# Patient Record
Sex: FEMALE | Race: WHITE | NOT HISPANIC OR LATINO | Employment: OTHER | URBAN - NONMETROPOLITAN AREA
[De-identification: names, ages, dates, MRNs, and addresses within clinical notes are randomized per-mention and may not be internally consistent; named-entity substitution may affect disease eponyms.]

---

## 2021-03-23 ENCOUNTER — APPOINTMENT (OUTPATIENT)
Dept: GENERAL RADIOLOGY | Facility: HOSPITAL | Age: 66
End: 2021-03-23

## 2021-03-23 ENCOUNTER — APPOINTMENT (OUTPATIENT)
Dept: CT IMAGING | Facility: HOSPITAL | Age: 66
End: 2021-03-23

## 2021-03-23 ENCOUNTER — HOSPITAL ENCOUNTER (EMERGENCY)
Facility: HOSPITAL | Age: 66
Discharge: HOME | End: 2021-03-23
Attending: FAMILY MEDICINE | Admitting: FAMILY MEDICINE

## 2021-03-23 VITALS
WEIGHT: 155 LBS | TEMPERATURE: 98.7 F | SYSTOLIC BLOOD PRESSURE: 140 MMHG | DIASTOLIC BLOOD PRESSURE: 88 MMHG | RESPIRATION RATE: 18 BRPM | HEART RATE: 67 BPM | BODY MASS INDEX: 23.49 KG/M2 | HEIGHT: 68 IN | OXYGEN SATURATION: 100 %

## 2021-03-23 DIAGNOSIS — S39.011A STRAIN OF ABDOMINAL WALL, INITIAL ENCOUNTER: Primary | ICD-10-CM

## 2021-03-23 LAB
ALBUMIN SERPL-MCNC: 4.08 G/DL (ref 3.5–5.2)
ALBUMIN/GLOB SERPL: 1.7 G/DL
ALP SERPL-CCNC: 86 U/L (ref 39–117)
ALT SERPL W P-5'-P-CCNC: 44 U/L (ref 1–33)
ANION GAP SERPL CALCULATED.3IONS-SCNC: 8.2 MMOL/L (ref 5–15)
AST SERPL-CCNC: 26 U/L (ref 1–32)
BASOPHILS # BLD AUTO: 0.03 10*3/MM3 (ref 0–0.2)
BASOPHILS NFR BLD AUTO: 0.8 % (ref 0–1.5)
BILIRUB SERPL-MCNC: 0.5 MG/DL (ref 0–1.2)
BILIRUB UR QL STRIP: NEGATIVE
BUN SERPL-MCNC: 13 MG/DL (ref 8–23)
BUN/CREAT SERPL: 17.6 (ref 7–25)
CALCIUM SPEC-SCNC: 9.1 MG/DL (ref 8.6–10.5)
CHLORIDE SERPL-SCNC: 108 MMOL/L (ref 98–107)
CLARITY UR: CLEAR
CO2 SERPL-SCNC: 23.8 MMOL/L (ref 22–29)
COLOR UR: YELLOW
CREAT SERPL-MCNC: 0.74 MG/DL (ref 0.57–1)
D-LACTATE SERPL-SCNC: 1.5 MMOL/L (ref 0.5–2)
DEPRECATED RDW RBC AUTO: 43.8 FL (ref 37–54)
EOSINOPHIL # BLD AUTO: 0.09 10*3/MM3 (ref 0–0.4)
EOSINOPHIL NFR BLD AUTO: 2.3 % (ref 0.3–6.2)
ERYTHROCYTE [DISTWIDTH] IN BLOOD BY AUTOMATED COUNT: 13.2 % (ref 12.3–15.4)
GFR SERPL CREATININE-BSD FRML MDRD: 79 ML/MIN/1.73
GLOBULIN UR ELPH-MCNC: 2.4 GM/DL
GLUCOSE SERPL-MCNC: 185 MG/DL (ref 65–99)
GLUCOSE UR STRIP-MCNC: ABNORMAL MG/DL
HCT VFR BLD AUTO: 38.6 % (ref 34–46.6)
HGB BLD-MCNC: 11.9 G/DL (ref 12–15.9)
HGB UR QL STRIP.AUTO: NEGATIVE
IMM GRANULOCYTES # BLD AUTO: 0.01 10*3/MM3 (ref 0–0.05)
IMM GRANULOCYTES NFR BLD AUTO: 0.3 % (ref 0–0.5)
KETONES UR QL STRIP: NEGATIVE
LEUKOCYTE ESTERASE UR QL STRIP.AUTO: NEGATIVE
LYMPHOCYTES # BLD AUTO: 1.21 10*3/MM3 (ref 0.7–3.1)
LYMPHOCYTES NFR BLD AUTO: 31 % (ref 19.6–45.3)
MCH RBC QN AUTO: 27.9 PG (ref 26.6–33)
MCHC RBC AUTO-ENTMCNC: 30.8 G/DL (ref 31.5–35.7)
MCV RBC AUTO: 90.4 FL (ref 79–97)
MONOCYTES # BLD AUTO: 0.34 10*3/MM3 (ref 0.1–0.9)
MONOCYTES NFR BLD AUTO: 8.7 % (ref 5–12)
NEUTROPHILS NFR BLD AUTO: 2.22 10*3/MM3 (ref 1.7–7)
NEUTROPHILS NFR BLD AUTO: 56.9 % (ref 42.7–76)
NITRITE UR QL STRIP: NEGATIVE
NRBC BLD AUTO-RTO: 0 /100 WBC (ref 0–0.2)
PH UR STRIP.AUTO: 5.5 [PH] (ref 5–8)
PLATELET # BLD AUTO: 173 10*3/MM3 (ref 140–450)
PMV BLD AUTO: 11.2 FL (ref 6–12)
POTASSIUM SERPL-SCNC: 4.2 MMOL/L (ref 3.5–5.2)
PROT SERPL-MCNC: 6.5 G/DL (ref 6–8.5)
PROT UR QL STRIP: NEGATIVE
RBC # BLD AUTO: 4.27 10*6/MM3 (ref 3.77–5.28)
SODIUM SERPL-SCNC: 140 MMOL/L (ref 136–145)
SP GR UR STRIP: 1.01 (ref 1–1.03)
TROPONIN T SERPL-MCNC: <0.01 NG/ML (ref 0–0.03)
UROBILINOGEN UR QL STRIP: ABNORMAL
WBC # BLD AUTO: 3.9 10*3/MM3 (ref 3.4–10.8)

## 2021-03-23 PROCEDURE — 96375 TX/PRO/DX INJ NEW DRUG ADDON: CPT

## 2021-03-23 PROCEDURE — 25010000002 ONDANSETRON PER 1 MG: Performed by: PHYSICIAN ASSISTANT

## 2021-03-23 PROCEDURE — 93010 ELECTROCARDIOGRAM REPORT: CPT | Performed by: INTERNAL MEDICINE

## 2021-03-23 PROCEDURE — 80053 COMPREHEN METABOLIC PANEL: CPT | Performed by: PHYSICIAN ASSISTANT

## 2021-03-23 PROCEDURE — 74176 CT ABD & PELVIS W/O CONTRAST: CPT

## 2021-03-23 PROCEDURE — 99283 EMERGENCY DEPT VISIT LOW MDM: CPT

## 2021-03-23 PROCEDURE — 25010000002 MORPHINE PER 10 MG: Performed by: FAMILY MEDICINE

## 2021-03-23 PROCEDURE — 81003 URINALYSIS AUTO W/O SCOPE: CPT | Performed by: PHYSICIAN ASSISTANT

## 2021-03-23 PROCEDURE — 84484 ASSAY OF TROPONIN QUANT: CPT | Performed by: PHYSICIAN ASSISTANT

## 2021-03-23 PROCEDURE — 25010000002 DEXAMETHASONE PER 1 MG: Performed by: PHYSICIAN ASSISTANT

## 2021-03-23 PROCEDURE — 85025 COMPLETE CBC W/AUTO DIFF WBC: CPT | Performed by: PHYSICIAN ASSISTANT

## 2021-03-23 PROCEDURE — 96374 THER/PROPH/DIAG INJ IV PUSH: CPT

## 2021-03-23 PROCEDURE — 71101 X-RAY EXAM UNILAT RIBS/CHEST: CPT | Performed by: RADIOLOGY

## 2021-03-23 PROCEDURE — 93005 ELECTROCARDIOGRAM TRACING: CPT | Performed by: PHYSICIAN ASSISTANT

## 2021-03-23 PROCEDURE — 71101 X-RAY EXAM UNILAT RIBS/CHEST: CPT

## 2021-03-23 PROCEDURE — 96376 TX/PRO/DX INJ SAME DRUG ADON: CPT

## 2021-03-23 PROCEDURE — 25010000002 KETOROLAC TROMETHAMINE PER 15 MG: Performed by: PHYSICIAN ASSISTANT

## 2021-03-23 PROCEDURE — 25010000002 ORPHENADRINE CITRATE PER 60 MG: Performed by: PHYSICIAN ASSISTANT

## 2021-03-23 PROCEDURE — 87040 BLOOD CULTURE FOR BACTERIA: CPT | Performed by: PHYSICIAN ASSISTANT

## 2021-03-23 PROCEDURE — 74176 CT ABD & PELVIS W/O CONTRAST: CPT | Performed by: RADIOLOGY

## 2021-03-23 PROCEDURE — 83605 ASSAY OF LACTIC ACID: CPT | Performed by: PHYSICIAN ASSISTANT

## 2021-03-23 RX ORDER — DEXAMETHASONE SODIUM PHOSPHATE 10 MG/ML
10 INJECTION INTRAMUSCULAR; INTRAVENOUS ONCE
Status: COMPLETED | OUTPATIENT
Start: 2021-03-23 | End: 2021-03-23

## 2021-03-23 RX ORDER — ONDANSETRON 2 MG/ML
4 INJECTION INTRAMUSCULAR; INTRAVENOUS ONCE
Status: COMPLETED | OUTPATIENT
Start: 2021-03-23 | End: 2021-03-23

## 2021-03-23 RX ORDER — SODIUM CHLORIDE 0.9 % (FLUSH) 0.9 %
10 SYRINGE (ML) INJECTION AS NEEDED
Status: DISCONTINUED | OUTPATIENT
Start: 2021-03-23 | End: 2021-03-23 | Stop reason: HOSPADM

## 2021-03-23 RX ORDER — ORPHENADRINE CITRATE 30 MG/ML
60 INJECTION INTRAMUSCULAR; INTRAVENOUS ONCE
Status: COMPLETED | OUTPATIENT
Start: 2021-03-23 | End: 2021-03-23

## 2021-03-23 RX ORDER — KETOROLAC TROMETHAMINE 30 MG/ML
15 INJECTION, SOLUTION INTRAMUSCULAR; INTRAVENOUS ONCE
Status: COMPLETED | OUTPATIENT
Start: 2021-03-23 | End: 2021-03-23

## 2021-03-23 RX ADMIN — SODIUM CHLORIDE 1000 ML: 9 INJECTION, SOLUTION INTRAVENOUS at 13:17

## 2021-03-23 RX ADMIN — KETOROLAC TROMETHAMINE 15 MG: 30 INJECTION, SOLUTION INTRAMUSCULAR at 17:28

## 2021-03-23 RX ADMIN — MORPHINE SULFATE 4 MG: 4 INJECTION, SOLUTION INTRAMUSCULAR; INTRAVENOUS at 15:03

## 2021-03-23 RX ADMIN — ORPHENADRINE CITRATE 60 MG: 60 INJECTION INTRAMUSCULAR; INTRAVENOUS at 17:28

## 2021-03-23 RX ADMIN — DEXAMETHASONE SODIUM PHOSPHATE 10 MG: 10 INJECTION INTRAMUSCULAR; INTRAVENOUS at 17:28

## 2021-03-23 RX ADMIN — ONDANSETRON 4 MG: 2 INJECTION INTRAMUSCULAR; INTRAVENOUS at 13:09

## 2021-03-23 RX ADMIN — MORPHINE SULFATE 4 MG: 4 INJECTION, SOLUTION INTRAMUSCULAR; INTRAVENOUS at 13:09

## 2021-03-23 NOTE — ED NOTES
Pt complains of left rib pain that radiates into left flank. No LAZARO voiced. Pt states movement exacerbates pain. Vitals stable, NADN.      Aung Odell RN  03/23/21 1412

## 2021-03-23 NOTE — DISCHARGE INSTRUCTIONS
Please utilize rest, ice and ibuprofen for pain relief. Please follow up with yoru PCP in 2 days or return to ER if symptoms worsen.

## 2021-03-23 NOTE — ED PROVIDER NOTES
Subjective   This is a 65 year old female patient who presents to the ER with chief complaint of left sided flank pain. Patient's PMH significant for DM (not requiring insulin), HLD and epilepsy (on keppra). Patient woke up with left sided flank pain this morning. Pain doesn't radiate. It is severe and cramping. No known injury though she did do some lifting of logs yesterday. She does have a history of kidney stones but says this feels different. She denies urinary symptoms and fever. She denies chest pain and SOB.           Review of Systems   Constitutional: Negative.  Negative for fever.   HENT: Negative.    Respiratory: Negative.    Cardiovascular: Negative.  Negative for chest pain.   Gastrointestinal: Positive for abdominal pain and nausea. Negative for abdominal distention, anal bleeding, blood in stool, constipation, diarrhea, rectal pain and vomiting.   Endocrine: Negative.    Genitourinary: Positive for flank pain. Negative for decreased urine volume, difficulty urinating, dyspareunia, dysuria, enuresis, frequency, genital sores, hematuria, menstrual problem, pelvic pain, urgency, vaginal bleeding, vaginal discharge and vaginal pain.   Skin: Negative.    Neurological: Negative.    Psychiatric/Behavioral: Negative.    All other systems reviewed and are negative.      No past medical history on file.    Allergies   Allergen Reactions   • Demerol [Meperidine] Itching     nausea   • Penicillins Itching       No past surgical history on file.    No family history on file.    Social History     Socioeconomic History   • Marital status: Unknown     Spouse name: Not on file   • Number of children: Not on file   • Years of education: Not on file   • Highest education level: Not on file           Objective   Physical Exam  Vitals and nursing note reviewed.   Constitutional:       General: She is not in acute distress.     Appearance: She is well-developed. She is not diaphoretic.   HENT:      Head: Normocephalic  and atraumatic.      Right Ear: External ear normal.      Left Ear: External ear normal.      Nose: Nose normal.   Eyes:      Conjunctiva/sclera: Conjunctivae normal.      Pupils: Pupils are equal, round, and reactive to light.   Neck:      Vascular: No JVD.      Trachea: No tracheal deviation.   Cardiovascular:      Rate and Rhythm: Normal rate and regular rhythm.      Heart sounds: Normal heart sounds. No murmur heard.     Pulmonary:      Effort: Pulmonary effort is normal. No respiratory distress.      Breath sounds: Normal breath sounds. No wheezing.   Abdominal:      General: Bowel sounds are normal. There is no distension.      Palpations: Abdomen is soft. There is no mass.      Tenderness: There is abdominal tenderness. There is left CVA tenderness. There is no right CVA tenderness, guarding or rebound.      Hernia: No hernia is present.      Comments: Tenderness to palpation in the left flank region.    Musculoskeletal:         General: No deformity. Normal range of motion.      Cervical back: Normal range of motion and neck supple.   Skin:     General: Skin is warm and dry.      Coloration: Skin is not pale.      Findings: No erythema or rash.   Neurological:      Mental Status: She is alert and oriented to person, place, and time.      Cranial Nerves: No cranial nerve deficit.   Psychiatric:         Behavior: Behavior normal.         Thought Content: Thought content normal.         Procedures           ED Course  ED Course as of Mar 23 1755   Tue Mar 23, 2021   1653 IMPRESSION:  1. No pneumothorax  2. No acute rib fracture  3. Lungs adequately aerated      This report was finalized on 3/23/2021 4:40 PM by Dr. Los Flores MD.   XR Ribs Left With PA Chest [MM]   1719 IMPRESSION:     1. No obstructive uropathy or urolithiasis     2. Other findings as discussed above.        3. moderate to large volume stool              This report was finalized on 3/23/2021 4:47 PM by Dr. Los Flores MD.   CT Abdomen  Pelvis Stone Protocol [MM]   1753 Patient diagnosed with left sided abdominal muscle strain. Will be d/c home with instructions for rest and ice. Will f/u with PCP in 2 days or return to ER if symptoms worsen.     [MM]      ED Course User Index  [MM] Brittany Chowdary PA                                           MDM  Number of Diagnoses or Management Options     Amount and/or Complexity of Data Reviewed  Clinical lab tests: ordered and reviewed  Tests in the radiology section of CPT®: ordered and reviewed  Discuss the patient with other providers: yes        Final diagnoses:   Strain of abdominal wall, initial encounter       ED Disposition  ED Disposition     ED Disposition Condition Comment    Discharge Stable           Ras Serrato MD  [address] [place] [id number] [phone]    In 2 days           Medication List      No changes were made to your prescriptions during this visit.          Brittany Chowdary PA  03/23/21 8597

## 2021-03-24 LAB
QT INTERVAL: 452 MS
QTC INTERVAL: 416 MS

## 2021-03-26 ENCOUNTER — HOSPITAL ENCOUNTER (OUTPATIENT)
Dept: MAMMOGRAPHY | Facility: HOSPITAL | Age: 66
Discharge: HOME | End: 2021-03-26
Admitting: INTERNAL MEDICINE

## 2021-03-26 DIAGNOSIS — Z12.31 VISIT FOR SCREENING MAMMOGRAM: ICD-10-CM

## 2021-03-26 PROCEDURE — 77063 BREAST TOMOSYNTHESIS BI: CPT

## 2021-03-26 PROCEDURE — 77067 SCR MAMMO BI INCL CAD: CPT | Performed by: RADIOLOGY

## 2021-03-26 PROCEDURE — 77063 BREAST TOMOSYNTHESIS BI: CPT | Performed by: RADIOLOGY

## 2021-03-26 PROCEDURE — 77067 SCR MAMMO BI INCL CAD: CPT

## 2021-03-28 LAB
BACTERIA SPEC AEROBE CULT: NORMAL
BACTERIA SPEC AEROBE CULT: NORMAL

## 2022-04-25 ENCOUNTER — HOSPITAL ENCOUNTER (OUTPATIENT)
Dept: MAMMOGRAPHY | Facility: HOSPITAL | Age: 67
Discharge: HOME | End: 2022-04-25
Admitting: INTERNAL MEDICINE

## 2022-04-25 DIAGNOSIS — Z12.31 VISIT FOR SCREENING MAMMOGRAM: ICD-10-CM

## 2022-04-25 PROCEDURE — 77063 BREAST TOMOSYNTHESIS BI: CPT | Performed by: RADIOLOGY

## 2022-04-25 PROCEDURE — 77067 SCR MAMMO BI INCL CAD: CPT | Performed by: RADIOLOGY

## 2022-04-25 PROCEDURE — 77067 SCR MAMMO BI INCL CAD: CPT

## 2022-04-25 PROCEDURE — 77063 BREAST TOMOSYNTHESIS BI: CPT

## 2022-09-06 ENCOUNTER — APPOINTMENT (OUTPATIENT)
Dept: GENERAL RADIOLOGY | Facility: HOSPITAL | Age: 67
End: 2022-09-06

## 2022-09-06 ENCOUNTER — HOSPITAL ENCOUNTER (EMERGENCY)
Facility: HOSPITAL | Age: 67
Discharge: HOME | End: 2022-09-06
Attending: EMERGENCY MEDICINE | Admitting: EMERGENCY MEDICINE

## 2022-09-06 VITALS
DIASTOLIC BLOOD PRESSURE: 71 MMHG | TEMPERATURE: 97.8 F | HEART RATE: 90 BPM | SYSTOLIC BLOOD PRESSURE: 131 MMHG | HEIGHT: 68 IN | WEIGHT: 148 LBS | OXYGEN SATURATION: 99 % | RESPIRATION RATE: 18 BRPM | BODY MASS INDEX: 22.43 KG/M2

## 2022-09-06 DIAGNOSIS — L02.414 ABSCESS OF LEFT FOREARM: Primary | ICD-10-CM

## 2022-09-06 LAB
ALBUMIN SERPL-MCNC: 4.58 G/DL (ref 3.5–5.2)
ALBUMIN/GLOB SERPL: 1.6 G/DL
ALP SERPL-CCNC: 143 U/L (ref 39–117)
ALT SERPL W P-5'-P-CCNC: 26 U/L (ref 1–33)
AMPHET+METHAMPHET UR QL: NEGATIVE
AMPHETAMINES UR QL: NEGATIVE
ANION GAP SERPL CALCULATED.3IONS-SCNC: 12.8 MMOL/L (ref 5–15)
AST SERPL-CCNC: 19 U/L (ref 1–32)
BACTERIA UR QL AUTO: ABNORMAL /HPF
BARBITURATES UR QL SCN: NEGATIVE
BASOPHILS # BLD AUTO: 0.04 10*3/MM3 (ref 0–0.2)
BASOPHILS NFR BLD AUTO: 0.8 % (ref 0–1.5)
BENZODIAZ UR QL SCN: NEGATIVE
BILIRUB SERPL-MCNC: 1.4 MG/DL (ref 0–1.2)
BILIRUB UR QL STRIP: ABNORMAL
BUN SERPL-MCNC: 15 MG/DL (ref 8–23)
BUN/CREAT SERPL: 20.8 (ref 7–25)
BUPRENORPHINE SERPL-MCNC: NEGATIVE NG/ML
CALCIUM SPEC-SCNC: 9.5 MG/DL (ref 8.6–10.5)
CANNABINOIDS SERPL QL: NEGATIVE
CHLORIDE SERPL-SCNC: 104 MMOL/L (ref 98–107)
CLARITY UR: CLEAR
CO2 SERPL-SCNC: 23.2 MMOL/L (ref 22–29)
COCAINE UR QL: NEGATIVE
COLOR UR: ABNORMAL
CREAT SERPL-MCNC: 0.72 MG/DL (ref 0.57–1)
CRP SERPL-MCNC: 3.11 MG/DL (ref 0–0.5)
D-LACTATE SERPL-SCNC: 1.6 MMOL/L (ref 0.5–2)
DEPRECATED RDW RBC AUTO: 45.5 FL (ref 37–54)
EGFRCR SERPLBLD CKD-EPI 2021: 91.8 ML/MIN/1.73
EOSINOPHIL # BLD AUTO: 0.24 10*3/MM3 (ref 0–0.4)
EOSINOPHIL NFR BLD AUTO: 4.6 % (ref 0.3–6.2)
ERYTHROCYTE [DISTWIDTH] IN BLOOD BY AUTOMATED COUNT: 13.6 % (ref 12.3–15.4)
ERYTHROCYTE [SEDIMENTATION RATE] IN BLOOD: 28 MM/HR (ref 0–30)
FLUAV SUBTYP SPEC NAA+PROBE: NOT DETECTED
FLUBV RNA ISLT QL NAA+PROBE: NOT DETECTED
GLOBULIN UR ELPH-MCNC: 2.9 GM/DL
GLUCOSE SERPL-MCNC: 98 MG/DL (ref 65–99)
GLUCOSE UR STRIP-MCNC: NEGATIVE MG/DL
HCT VFR BLD AUTO: 37.6 % (ref 34–46.6)
HGB BLD-MCNC: 12.1 G/DL (ref 12–15.9)
HGB UR QL STRIP.AUTO: NEGATIVE
HOLD SPECIMEN: NORMAL
HOLD SPECIMEN: NORMAL
HYALINE CASTS UR QL AUTO: ABNORMAL /LPF
IMM GRANULOCYTES # BLD AUTO: 0.01 10*3/MM3 (ref 0–0.05)
IMM GRANULOCYTES NFR BLD AUTO: 0.2 % (ref 0–0.5)
KETONES UR QL STRIP: ABNORMAL
LEUKOCYTE ESTERASE UR QL STRIP.AUTO: ABNORMAL
LYMPHOCYTES # BLD AUTO: 1.13 10*3/MM3 (ref 0.7–3.1)
LYMPHOCYTES NFR BLD AUTO: 21.7 % (ref 19.6–45.3)
MCH RBC QN AUTO: 29.2 PG (ref 26.6–33)
MCHC RBC AUTO-ENTMCNC: 32.2 G/DL (ref 31.5–35.7)
MCV RBC AUTO: 90.6 FL (ref 79–97)
METHADONE UR QL SCN: NEGATIVE
MONOCYTES # BLD AUTO: 0.32 10*3/MM3 (ref 0.1–0.9)
MONOCYTES NFR BLD AUTO: 6.1 % (ref 5–12)
NEUTROPHILS NFR BLD AUTO: 3.47 10*3/MM3 (ref 1.7–7)
NEUTROPHILS NFR BLD AUTO: 66.6 % (ref 42.7–76)
NITRITE UR QL STRIP: NEGATIVE
NRBC BLD AUTO-RTO: 0 /100 WBC (ref 0–0.2)
OPIATES UR QL: NEGATIVE
OXYCODONE UR QL SCN: NEGATIVE
PCP UR QL SCN: NEGATIVE
PH UR STRIP.AUTO: 5.5 [PH] (ref 5–8)
PLATELET # BLD AUTO: 217 10*3/MM3 (ref 140–450)
PMV BLD AUTO: 9.9 FL (ref 6–12)
POTASSIUM SERPL-SCNC: 4.2 MMOL/L (ref 3.5–5.2)
PROPOXYPH UR QL: NEGATIVE
PROT SERPL-MCNC: 7.5 G/DL (ref 6–8.5)
PROT UR QL STRIP: NEGATIVE
QT INTERVAL: 402 MS
QTC INTERVAL: 448 MS
RBC # BLD AUTO: 4.15 10*6/MM3 (ref 3.77–5.28)
RBC # UR STRIP: ABNORMAL /HPF
REF LAB TEST METHOD: ABNORMAL
SARS-COV-2 RNA PNL SPEC NAA+PROBE: NOT DETECTED
SODIUM SERPL-SCNC: 140 MMOL/L (ref 136–145)
SP GR UR STRIP: >=1.03 (ref 1–1.03)
SQUAMOUS #/AREA URNS HPF: ABNORMAL /HPF
TRICYCLICS UR QL SCN: NEGATIVE
TROPONIN T SERPL-MCNC: <0.01 NG/ML (ref 0–0.03)
UROBILINOGEN UR QL STRIP: ABNORMAL
WBC # UR STRIP: ABNORMAL /HPF
WBC NRBC COR # BLD: 5.21 10*3/MM3 (ref 3.4–10.8)
WHOLE BLOOD HOLD COAG: NORMAL
WHOLE BLOOD HOLD SPECIMEN: NORMAL

## 2022-09-06 PROCEDURE — 84484 ASSAY OF TROPONIN QUANT: CPT | Performed by: PHYSICIAN ASSISTANT

## 2022-09-06 PROCEDURE — C9803 HOPD COVID-19 SPEC COLLECT: HCPCS

## 2022-09-06 PROCEDURE — 85025 COMPLETE CBC W/AUTO DIFF WBC: CPT | Performed by: PHYSICIAN ASSISTANT

## 2022-09-06 PROCEDURE — 80306 DRUG TEST PRSMV INSTRMNT: CPT | Performed by: PHYSICIAN ASSISTANT

## 2022-09-06 PROCEDURE — 87040 BLOOD CULTURE FOR BACTERIA: CPT | Performed by: PHYSICIAN ASSISTANT

## 2022-09-06 PROCEDURE — 81001 URINALYSIS AUTO W/SCOPE: CPT | Performed by: PHYSICIAN ASSISTANT

## 2022-09-06 PROCEDURE — 73090 X-RAY EXAM OF FOREARM: CPT

## 2022-09-06 PROCEDURE — 86140 C-REACTIVE PROTEIN: CPT | Performed by: PHYSICIAN ASSISTANT

## 2022-09-06 PROCEDURE — 93010 ELECTROCARDIOGRAM REPORT: CPT | Performed by: INTERNAL MEDICINE

## 2022-09-06 PROCEDURE — 36415 COLL VENOUS BLD VENIPUNCTURE: CPT

## 2022-09-06 PROCEDURE — 83605 ASSAY OF LACTIC ACID: CPT | Performed by: PHYSICIAN ASSISTANT

## 2022-09-06 PROCEDURE — 73090 X-RAY EXAM OF FOREARM: CPT | Performed by: RADIOLOGY

## 2022-09-06 PROCEDURE — 99284 EMERGENCY DEPT VISIT MOD MDM: CPT

## 2022-09-06 PROCEDURE — 73060 X-RAY EXAM OF HUMERUS: CPT | Performed by: RADIOLOGY

## 2022-09-06 PROCEDURE — 73060 X-RAY EXAM OF HUMERUS: CPT

## 2022-09-06 PROCEDURE — 85652 RBC SED RATE AUTOMATED: CPT | Performed by: PHYSICIAN ASSISTANT

## 2022-09-06 PROCEDURE — 93005 ELECTROCARDIOGRAM TRACING: CPT | Performed by: PHYSICIAN ASSISTANT

## 2022-09-06 PROCEDURE — 87636 SARSCOV2 & INF A&B AMP PRB: CPT | Performed by: PHYSICIAN ASSISTANT

## 2022-09-06 PROCEDURE — 80053 COMPREHEN METABOLIC PANEL: CPT | Performed by: PHYSICIAN ASSISTANT

## 2022-09-06 RX ORDER — SULFAMETHOXAZOLE AND TRIMETHOPRIM 800; 160 MG/1; MG/1
1 TABLET ORAL 2 TIMES DAILY
Qty: 20 TABLET | Refills: 0 | Status: SHIPPED | OUTPATIENT
Start: 2022-09-06 | End: 2022-09-16

## 2022-09-06 RX ORDER — SULFAMETHOXAZOLE AND TRIMETHOPRIM 800; 160 MG/1; MG/1
1 TABLET ORAL ONCE
Status: COMPLETED | OUTPATIENT
Start: 2022-09-06 | End: 2022-09-06

## 2022-09-06 RX ORDER — SODIUM CHLORIDE 0.9 % (FLUSH) 0.9 %
10 SYRINGE (ML) INJECTION AS NEEDED
Status: DISCONTINUED | OUTPATIENT
Start: 2022-09-06 | End: 2022-09-06 | Stop reason: HOSPADM

## 2022-09-06 RX ADMIN — SULFAMETHOXAZOLE AND TRIMETHOPRIM 1 TABLET: 800; 160 TABLET ORAL at 13:04

## 2022-09-06 RX ADMIN — SODIUM CHLORIDE 1000 ML: 9 INJECTION, SOLUTION INTRAVENOUS at 13:24

## 2022-09-06 NOTE — ED PROVIDER NOTES
Subjective   PIT    Patient is a 66-year-old female that presents to the ED with complaints of left arm pain.  She states she was bit by a bug along the left forearm just below the elbow approximately a month ago.  She states initially it was a very large abscess and she treated it at home.  She states it has gotten significantly better but still present.  She states occasionally will have some drainage from it.  However she states now she is having fever, chills, pain radiating up the arm.  She states her PCP sent her to the emergency room for evaluation.  She denies chest pain, shortness of breath, nausea, vomiting, headache, dizziness, abdominal pain, dysuria, diarrhea, or constipation.      History provided by:  Patient   used: No        Review of Systems    No past medical history on file.    Allergies   Allergen Reactions   • Demerol [Meperidine] Itching     nausea   • Penicillins Itching       Past Surgical History:   Procedure Laterality Date   • HYSTERECTOMY         Family History   Problem Relation Age of Onset   • Breast cancer Niece        Social History     Socioeconomic History   • Marital status:            Objective   Physical Exam  Vitals and nursing note reviewed.   Constitutional:       General: She is not in acute distress.     Appearance: She is well-developed. She is not diaphoretic.   HENT:      Head: Normocephalic and atraumatic.      Right Ear: External ear normal.      Left Ear: External ear normal.      Nose: Nose normal.      Mouth/Throat:      Mouth: Mucous membranes are moist.      Pharynx: Oropharynx is clear.   Eyes:      Extraocular Movements: Extraocular movements intact.      Conjunctiva/sclera: Conjunctivae normal.      Pupils: Pupils are equal, round, and reactive to light.   Neck:      Vascular: No JVD.      Trachea: No tracheal deviation.   Cardiovascular:      Rate and Rhythm: Normal rate and regular rhythm.      Pulses: Normal pulses.      Heart  sounds: Normal heart sounds. No murmur heard.  Pulmonary:      Effort: Pulmonary effort is normal. No respiratory distress.      Breath sounds: Normal breath sounds. No wheezing.   Abdominal:      General: Bowel sounds are normal. There is no distension.      Palpations: Abdomen is soft.      Tenderness: There is no abdominal tenderness. There is no guarding or rebound.   Musculoskeletal:         General: No swelling, tenderness or deformity. Normal range of motion.      Cervical back: Normal range of motion and neck supple.      Right lower leg: No edema.      Left lower leg: No edema.   Skin:     General: Skin is warm and dry.      Coloration: Skin is not pale.      Findings: Abscess present. No erythema or rash.      Comments: Along the proximal anterior aspect of the left forearm there is a 1 cm area of erythema.  There is an area of scabbed over and minimal drainage from this area.  She has full range of motion at the shoulder, elbow, and wrist.  Her neurovascular status is intact.   Neurological:      General: No focal deficit present.      Mental Status: She is alert and oriented to person, place, and time.      Cranial Nerves: No cranial nerve deficit.   Psychiatric:         Mood and Affect: Mood normal.         Behavior: Behavior normal.         Thought Content: Thought content normal.         Procedures           ED Course  ED Course as of 09/06/22 1530   Tue Sep 06, 2022   1245 XR Forearm 2 View Left  IMPRESSION:    No acute findings in the left forearm.     This report was finalized on 9/6/2022 12:29 PM by Dr. Michael Gandhi MD. []   1245 XR Humerus Left  IMPRESSION:    No acute findings in the left humerus.     This report was finalized on 9/6/2022 12:29 PM by Dr. Michael Gandhi MD.    []   1527 ECG 15:13 NSR, rate 75. Poor r wave progression consistent with old anterior injury. QT/qTc 402/448 [KIMANI]   1529 Discussed plan of care with patient.  Advised if symptoms worsen return to ED.  Advised close  follow-up with PCP in 1 to 2 days. [MH]      ED Course User Index  [KIMANI] Shmuel Oconnor MD  [] Manju Anderson PA-C                                           [facility]    Final diagnoses:   Abscess of left forearm       ED Disposition  ED Disposition     ED Disposition   Discharge    Condition   Stable    Comment   --             Ras Serrato MD  [address] [place] [address] [phone]    Schedule an appointment as soon as possible for a visit in 1 day           Medication List      New Prescriptions    sulfamethoxazole-trimethoprim 800-160 MG per tablet  Commonly known as: BACTRIM DS,SEPTRA DS  Take 1 tablet by mouth 2 (Two) Times a Day for 10 days.           Where to Get Your Medications      You can get these medications from any pharmacy    Bring a paper prescription for each of these medications  · sulfamethoxazole-trimethoprim 800-160 MG per tablet          Manju Anderson PA-C  09/06/22 1538

## 2022-09-06 NOTE — ED NOTES
MEDICAL SCREENING:    Reason for Visit: Patient states that she got a bug bite on the left forearm approximately a month ago.  She states she is now having fever, chills, and pain throughout the arm.    Patient initially seen in triage.  The patient was advised further evaluation and diagnostic testing will be needed, some of the treatment and testing will be initiated in the lobby in order to begin the process.  The patient will be returned to the waiting area for the time being and possibly be re-assessed by a subsequent ED provider.  The patient will be brought back to the treatment area in as timely manner as possible.         Manju Anderson PA-C  09/06/22 1133

## 2022-09-11 LAB
BACTERIA SPEC AEROBE CULT: NORMAL
BACTERIA SPEC AEROBE CULT: NORMAL

## 2022-10-12 ENCOUNTER — HOSPITAL ENCOUNTER (OUTPATIENT)
Dept: MAMMOGRAPHY | Facility: HOSPITAL | Age: 67
Discharge: HOME | End: 2022-10-12

## 2022-10-12 ENCOUNTER — HOSPITAL ENCOUNTER (OUTPATIENT)
Dept: ULTRASOUND IMAGING | Facility: HOSPITAL | Age: 67
Discharge: HOME | End: 2022-10-12

## 2022-10-12 DIAGNOSIS — N64.89 GALACTOCELE: ICD-10-CM

## 2022-10-12 PROCEDURE — G0279 TOMOSYNTHESIS, MAMMO: HCPCS

## 2022-10-12 PROCEDURE — 77065 DX MAMMO INCL CAD UNI: CPT

## 2022-10-12 PROCEDURE — 76882 US LMTD JT/FCL EVL NVASC XTR: CPT

## 2022-10-12 PROCEDURE — 77065 DX MAMMO INCL CAD UNI: CPT | Performed by: RADIOLOGY

## 2022-10-12 PROCEDURE — 77061 BREAST TOMOSYNTHESIS UNI: CPT | Performed by: RADIOLOGY

## 2022-10-12 PROCEDURE — 76882 US LMTD JT/FCL EVL NVASC XTR: CPT | Performed by: RADIOLOGY

## 2022-10-18 ENCOUNTER — HOSPITAL ENCOUNTER (OUTPATIENT)
Dept: ULTRASOUND IMAGING | Facility: HOSPITAL | Age: 67
Discharge: HOME | End: 2022-10-18
Admitting: RADIOLOGY

## 2022-10-18 DIAGNOSIS — R59.1 LYMPHADENOPATHY: ICD-10-CM

## 2022-10-18 PROCEDURE — 10005 FNA BX W/US GDN 1ST LES: CPT | Performed by: RADIOLOGY

## 2022-10-18 PROCEDURE — 88173 CYTOPATH EVAL FNA REPORT: CPT

## 2022-10-20 LAB — REF LAB TEST METHOD: NORMAL

## 2022-10-25 ENCOUNTER — TELEPHONE (OUTPATIENT)
Dept: MAMMOGRAPHY | Facility: HOSPITAL | Age: 67
End: 2022-10-25

## 2022-10-25 NOTE — TELEPHONE ENCOUNTER
----- Message from Soumya Cheng MD sent at 10/24/2022  6:31 AM EDT -----  CYTOLOGY:    LEFT AXILLARY LYMPH NODE: Atypical.    COMMENT FROM THE PATHOLOGIST: Flow cytometry showed an atypical process which could potentially represent hematogones, but is not definitive. The patient's history of a bug bite with abscess on the same side as this axillary node is noted. This could represent reactive changes in the lymph node after resolving infection, but is overall of uncertain etiology. Recommend clinically following this patient to resolution of the lymphadenopathy. If the adenopathy persists after several weeks, then obtaining solid tissue morphology would be recommended to more definitively characterize the lymph node.    The pathology result is felt to likely be concordant with the imaging findings. Recommend patient return for a follow-up left axillary ultrasound in 4-6 weeks.    The patient will be notified of the results and recommendations by our breast care nurse.

## 2022-10-25 NOTE — TELEPHONE ENCOUNTER
Patient notified of lymph node biopsy results and recommendations. Encouraged patient to call with further needs. Patient to follow up in 4-6 weeks.

## 2022-12-08 ENCOUNTER — APPOINTMENT (OUTPATIENT)
Dept: ULTRASOUND IMAGING | Facility: HOSPITAL | Age: 67
End: 2022-12-08

## 2022-12-15 ENCOUNTER — HOSPITAL ENCOUNTER (OUTPATIENT)
Dept: ULTRASOUND IMAGING | Facility: HOSPITAL | Age: 67
Discharge: HOME | End: 2022-12-15
Admitting: INTERNAL MEDICINE

## 2022-12-15 DIAGNOSIS — I88.1 CHRONIC LYMPHADENITIS: ICD-10-CM

## 2022-12-15 PROCEDURE — 76882 US LMTD JT/FCL EVL NVASC XTR: CPT

## 2022-12-15 PROCEDURE — 76882 US LMTD JT/FCL EVL NVASC XTR: CPT | Performed by: RADIOLOGY

## 2023-05-25 ENCOUNTER — APPOINTMENT (OUTPATIENT)
Dept: ULTRASOUND IMAGING | Facility: HOSPITAL | Age: 68
End: 2023-05-25
Payer: OTHER GOVERNMENT

## 2023-05-25 ENCOUNTER — HOSPITAL ENCOUNTER (OUTPATIENT)
Dept: MAMMOGRAPHY | Facility: HOSPITAL | Age: 68
Discharge: HOME | End: 2023-05-25
Payer: OTHER GOVERNMENT

## 2023-05-25 DIAGNOSIS — R92.8 ABNORMAL MAMMOGRAM: ICD-10-CM

## 2023-05-25 PROCEDURE — 77066 DX MAMMO INCL CAD BI: CPT | Performed by: RADIOLOGY

## 2023-05-25 PROCEDURE — G0279 TOMOSYNTHESIS, MAMMO: HCPCS

## 2023-05-25 PROCEDURE — 77062 BREAST TOMOSYNTHESIS BI: CPT | Performed by: RADIOLOGY

## 2023-05-25 PROCEDURE — 77066 DX MAMMO INCL CAD BI: CPT

## 2023-09-07 ENCOUNTER — OFFICE VISIT (OUTPATIENT)
Dept: ORTHOPEDIC SURGERY | Facility: CLINIC | Age: 68
End: 2023-09-07
Payer: OTHER GOVERNMENT

## 2023-09-07 ENCOUNTER — HOSPITAL ENCOUNTER (OUTPATIENT)
Dept: GENERAL RADIOLOGY | Facility: HOSPITAL | Age: 68
Discharge: HOME | End: 2023-09-07
Admitting: PHYSICIAN ASSISTANT
Payer: OTHER GOVERNMENT

## 2023-09-07 VITALS
DIASTOLIC BLOOD PRESSURE: 78 MMHG | WEIGHT: 148 LBS | BODY MASS INDEX: 22.43 KG/M2 | HEIGHT: 68 IN | SYSTOLIC BLOOD PRESSURE: 124 MMHG | HEART RATE: 78 BPM

## 2023-09-07 DIAGNOSIS — M25.511 RIGHT SHOULDER PAIN, UNSPECIFIED CHRONICITY: Primary | ICD-10-CM

## 2023-09-07 PROCEDURE — 99203 OFFICE O/P NEW LOW 30 MIN: CPT | Performed by: PHYSICIAN ASSISTANT

## 2023-09-07 PROCEDURE — 73030 X-RAY EXAM OF SHOULDER: CPT

## 2023-09-07 RX ORDER — ATORVASTATIN CALCIUM 10 MG/1
1 TABLET, FILM COATED ORAL DAILY
COMMUNITY

## 2023-09-07 NOTE — PROGRESS NOTES
[facility] New Patient Visit          Patient: Sue Bullock  YOB: 1955  Date of Encounter: 09/07/2023  PCP: Ras Serrato MD      Subjective     Chief Complaint   Patient presents with    Right Shoulder - Pain           History of Present Illness:     Sue Bullock is a 67 y.o. female presents today as result of right shoulder pain.  Patient states no specific injury she states that she had an incident where she rolled over in her bed and the right shoulder popped.  She describes what she thinks is a dislocation however she reports that upon certain motions and activities she will have a popping sensation with active deformity of the shoulder and will require several moments before reduction.  Patient presents today for MRI results review and further evaluation.  She has attempted anti-inflammatory medication intermittently with no other further conservative treatment.  [facility]        There is no problem list on file for this patient.    History reviewed. No pertinent past medical history.  Past Surgical History:   Procedure Laterality Date    HYSTERECTOMY       Social History     Occupational History    Not on file   Tobacco Use    Smoking status: Never    Smokeless tobacco: Never   Vaping Use    Vaping Use: Never used   Substance and Sexual Activity    Alcohol use: Never    Drug use: Never    Sexual activity: Defer    Sue Bullock  reports that she has never smoked. She has never used smokeless tobacco.. I have educated her on the risk of diseases from using tobacco products such as cancer, COPD, and heart disease.             Social History     Social History Narrative    Not on file     Family History   Problem Relation Age of Onset    Diabetes Sister     Cancer Niece     Breast cancer Niece      Current Outpatient Medications   Medication Sig Dispense Refill    Ergocalciferol (VITAMIN D2 PO) Take  "50,000 Units by mouth Every 7 (Seven) Days.      atorvastatin (LIPITOR) 10 MG tablet Take 1 tablet by mouth Daily.       No current facility-administered medications for this visit.     Allergies   Allergen Reactions    Demerol [Meperidine] Itching     nausea    Penicillins Itching            Review of Systems   Constitutional: Positive for weight gain.   HENT: Negative.     Eyes: Negative.    Cardiovascular: Negative.    Respiratory: Negative.     Endocrine: Negative.    Hematologic/Lymphatic: Negative.    Skin: Negative.    Musculoskeletal:         Pertinent positives listed in HPI   Gastrointestinal: Negative.    Genitourinary: Negative.    Neurological: Negative.    Psychiatric/Behavioral: Negative.     Allergic/Immunologic: Negative.    All other systems reviewed and are negative.      Objective      Vitals:    09/07/23 0916   BP: 124/78   BP Location: Left arm   Patient Position: Sitting   Cuff Size: Adult   Pulse: 78   Weight: 67.1 kg (148 lb)   Height: 172.7 cm (68\")      BMI is within normal parameters. No other follow-up for BMI required.      Physical Exam  Vitals and nursing note reviewed.   Constitutional:       General: She is not in acute distress.     Appearance: She is not ill-appearing.   HENT:      Head: Normocephalic and atraumatic.      Right Ear: External ear normal.      Left Ear: External ear normal.      Nose: Nose normal. No congestion or rhinorrhea.   Eyes:      Extraocular Movements: Extraocular movements intact.      Conjunctiva/sclera: Conjunctivae normal.      Pupils: Pupils are equal, round, and reactive to light.   Cardiovascular:      Rate and Rhythm: Normal rate.      Pulses: Normal pulses.   Pulmonary:      Effort: Pulmonary effort is normal. No respiratory distress.      Breath sounds: No stridor.   Abdominal:      General: There is no distension.   Musculoskeletal:      Cervical back: Normal range of motion.      Comments: Examination today of the patient's right shoulder " reveals painful forward elevation greater than 80 degrees.  Abduction 90 degrees before pain and apprehension.  Patient has adequate external rotation at side to approximately 30 degrees comparable to contralateral side.  There is crepitus palpable grinding upon external rotation and internal rotation of the right upper extremity.  Jobes maneuver painful speeds test negative.  Empty can test negative.  Apprehension test positive.  Neurovascular status grossly intact right upper extremity.   Skin:     General: Skin is warm and dry.      Capillary Refill: Capillary refill takes less than 2 seconds.   Neurological:      General: No focal deficit present.      Mental Status: She is alert and oriented to person, place, and time.   Psychiatric:         Mood and Affect: Mood normal.         Behavior: Behavior normal.         Thought Content: Thought content normal.         Judgment: Judgment normal.               Radiology:        MRI right shoulder performed 8/23/2023 from outside source reveals advanced arthrosis glenohumeral joint.  There is a lobulated 1 cm cyst and lesion inferior to the axillary pouch this is nonspecific although favored represent a paralabral cyst arising from the inferior labrum.  Low-grade partial-thickness tearing of the articular surface of the fibers of the supraspinatus and infraspinatus.  No full-thickness tear or retraction or atrophy.      Assessment/Plan        ICD-10-CM ICD-9-CM   1. Right shoulder pain, unspecified chronicity  M25.511 719.41       67-year-old female with notable right shoulder pain complaints as result of an acute onset intermittent nature crepitus and pain and popping right shoulder.  Patient has notable evidence of advanced arthrosis glenohumeral joint and MRI evidence of rotator cuff pathology and a 1 cm cystic lesion thought to represent paralabral cyst. There is radiographic evidence concerning for shoulder girdle instability.  Patient was given an order for a sling  and paul.  She was instructed to return back in 2 weeks for further evaluation.                      This document was signed by Regan Zamora PA-C September 7, 2023    CC: Ras Serrato MD      Dictated Utilizing Dragon Dictation:   Please note that portions of this note were completed with a voice recognition program.   Part of this note may be an electronic transcription/translation of spoken language to printed text using the Dragon Dictation System.

## 2023-09-14 ENCOUNTER — PATIENT ROUNDING (BHMG ONLY) (OUTPATIENT)
Dept: ORTHOPEDIC SURGERY | Facility: CLINIC | Age: 68
End: 2023-09-14
Payer: OTHER GOVERNMENT

## 2023-09-14 NOTE — PROGRESS NOTES
September 14, 2023    Hello, may I speak with Sue Bullock?    My name is Toña RAE,      I am  with [facility] [place]  [facility]  [address]  [place] [address] [phone].    Before we get started may I verify your date of birth? 1955    I am calling to officially welcome you to our practice and ask about your recent visit. Is this a good time to talk? yes    Tell me about your visit with us. What things went well?  Good appointment.       We're always looking for ways to make our patients' experiences even better. Do you have recommendations on ways we may improve?  no    Overall were you satisfied with your first visit to our practice? yes       I appreciate you taking the time to speak with me today. Is there anything else I can do for you? no      Thank you, and have a great day.

## 2023-09-21 ENCOUNTER — OFFICE VISIT (OUTPATIENT)
Dept: ORTHOPEDIC SURGERY | Facility: CLINIC | Age: 68
End: 2023-09-21
Payer: OTHER GOVERNMENT

## 2023-09-21 VITALS — HEIGHT: 68 IN | WEIGHT: 147.93 LBS | BODY MASS INDEX: 22.42 KG/M2

## 2023-09-21 DIAGNOSIS — M25.511 RIGHT SHOULDER PAIN, UNSPECIFIED CHRONICITY: Primary | ICD-10-CM

## 2023-09-21 RX ADMIN — LIDOCAINE HYDROCHLORIDE 5 ML: 10 INJECTION, SOLUTION EPIDURAL; INFILTRATION; INTRACAUDAL; PERINEURAL at 15:28

## 2023-09-21 RX ADMIN — METHYLPREDNISOLONE ACETATE 80 MG: 80 INJECTION, SUSPENSION INTRA-ARTICULAR; INTRALESIONAL; INTRAMUSCULAR; SOFT TISSUE at 15:28

## 2023-09-21 NOTE — PROGRESS NOTES
[facility]opaedic Surgery Established Patient Visit          Patient: Sue Bullock  YOB: 1955  Date of Encounter: 09/21/2023  PCP: Ras Serrato MD      Subjective     Chief Complaint   Patient presents with    Right Shoulder - Pain, Follow-up           History of Present Illness:     Sue Bullock is a 67 y.o. female presents today as result of right shoulder pain.  Patient states no specific injury she states that she had an incident where she rolled over in her bed and the right shoulder popped.  She describes what she thinks is a dislocation however she reports that upon certain motions and activities she will have a popping sensation with active deformity of the shoulder and will require several moments before reduction.  Patient presents today for MRI results review and further evaluation.  She has attempted anti-inflammatory medication intermittently as well as immobilization with continuation of pain symptoms.  She has questions in regards to further conservative treatment options secondary to the continued pain and symptoms.         There is no problem list on file for this patient.    History reviewed. No pertinent past medical history.  Past Surgical History:   Procedure Laterality Date    HYSTERECTOMY       Social History     Occupational History    Not on file   Tobacco Use    Smoking status: Never    Smokeless tobacco: Never   Vaping Use    Vaping Use: Never used   Substance and Sexual Activity    Alcohol use: Never    Drug use: Never    Sexual activity: Defer    Sue Bullock  reports that she has never smoked. She has never used smokeless tobacco.. I have educated her on the risk of diseases from using tobacco products such as cancer, COPD, and heart disease.             Social History     Social History Narrative    Not on file     Family History   Problem Relation Age of Onset    Diabetes Sister     Cancer Niece     Breast cancer Niece      Current Outpatient  "Medications   Medication Sig Dispense Refill    atorvastatin (LIPITOR) 10 MG tablet Take 1 tablet by mouth Daily.      Ergocalciferol (VITAMIN D2 PO) Take 50,000 Units by mouth Every 7 (Seven) Days.       No current facility-administered medications for this visit.     Allergies   Allergen Reactions    Demerol [Meperidine] Itching     nausea    Penicillins Itching            Review of Systems   Constitutional: Positive for weight gain.   HENT: Negative.     Eyes: Negative.    Cardiovascular: Negative.    Respiratory: Negative.     Endocrine: Negative.    Hematologic/Lymphatic: Negative.    Skin: Negative.    Musculoskeletal:         Pertinent positives listed in HPI   Gastrointestinal: Negative.    Genitourinary: Negative.    Neurological: Negative.    Psychiatric/Behavioral: Negative.     Allergic/Immunologic: Negative.    All other systems reviewed and are negative.      Objective      Vitals:    09/21/23 1108   Weight: 67.1 kg (147 lb 14.9 oz)   Height: 172.7 cm (67.99\")      BMI is within normal parameters. No other follow-up for BMI required.      Physical Exam  Vitals and nursing note reviewed.   Constitutional:       General: She is not in acute distress.     Appearance: She is not ill-appearing.   HENT:      Head: Normocephalic and atraumatic.      Right Ear: External ear normal.      Left Ear: External ear normal.      Nose: Nose normal. No congestion or rhinorrhea.   Eyes:      Extraocular Movements: Extraocular movements intact.      Conjunctiva/sclera: Conjunctivae normal.      Pupils: Pupils are equal, round, and reactive to light.   Cardiovascular:      Rate and Rhythm: Normal rate.      Pulses: Normal pulses.   Pulmonary:      Effort: Pulmonary effort is normal. No respiratory distress.      Breath sounds: No stridor.   Abdominal:      General: There is no distension.   Musculoskeletal:      Cervical back: Normal range of motion.      Comments: Examination today of the patient's right shoulder reveals " painful forward elevation greater than 80 degrees.  Abduction 90 degrees before pain and apprehension.  Patient has adequate external rotation at side to approximately 30 degrees comparable to contralateral side.  There is crepitus palpable grinding upon external rotation and internal rotation of the right upper extremity.  Jobes maneuver painful speeds test negative.  Empty can test negative.  Apprehension test positive.  Neurovascular status grossly intact right upper extremity.   Skin:     General: Skin is warm and dry.      Capillary Refill: Capillary refill takes less than 2 seconds.   Neurological:      General: No focal deficit present.      Mental Status: She is alert and oriented to person, place, and time.   Psychiatric:         Mood and Affect: Mood normal.         Behavior: Behavior normal.         Thought Content: Thought content normal.         Judgment: Judgment normal.               Radiology:        MRI right shoulder performed 8/23/2023 from outside source reveals advanced arthrosis glenohumeral joint.  There is a lobulated 1 cm cyst and lesion inferior to the axillary pouch this is nonspecific although favored represent a paralabral cyst arising from the inferior labrum.  Low-grade partial-thickness tearing of the articular surface of the fibers of the supraspinatus and infraspinatus.  No full-thickness tear or retraction or atrophy.      Assessment/Plan        ICD-10-CM ICD-9-CM   1. Right shoulder pain, unspecified chronicity  M25.511 719.41       67-year-old female with notable right shoulder pain complaints as result of an acute onset intermittent nature crepitus and pain and popping right shoulder.  Patient has notable evidence of advanced arthrosis glenohumeral joint and MRI evidence of rotator cuff pathology and a 1 cm cystic lesion thought to represent paralabral cyst. There is radiographic evidence concerning for shoulder girdle instability.  Patient was given a subacromial bursal  injection 80 mg Depo-Medrol with lidocaine block injected into the subacromial space of the right shoulder.  Patient tolerated this procedure well.  She was instructed to return back in 3 weeks for further evaluation.          Large Joint Arthrocentesis: R subacromial bursa  Date/Time: 9/21/2023 3:28 PM  Consent given by: patient  Site marked: site marked  Supporting Documentation  Indications: pain and diagnostic evaluation   Procedure Details  Location: shoulder - R subacromial bursa  Needle size: 25 G  Approach: lateral  Medications administered: 80 mg methylPREDNISolone acetate 80 MG/ML; 5 mL lidocaine PF 1% 1 %  Patient tolerance: patient tolerated the procedure well with no immediate complications              This document was signed by Regan Zamora PA-C September 7, 2023    CC: Ras Serrato MD      Dictated Utilizing Dragon Dictation:   Please note that portions of this note were completed with a voice recognition program.   Part of this note may be an electronic transcription/translation of spoken language to printed text using the Dragon Dictation System.

## 2023-10-04 RX ORDER — METHYLPREDNISOLONE ACETATE 80 MG/ML
80 INJECTION, SUSPENSION INTRA-ARTICULAR; INTRALESIONAL; INTRAMUSCULAR; SOFT TISSUE
Status: COMPLETED | OUTPATIENT
Start: 2023-09-21 | End: 2023-09-21

## 2023-10-04 RX ORDER — LIDOCAINE HYDROCHLORIDE 10 MG/ML
5 INJECTION, SOLUTION EPIDURAL; INFILTRATION; INTRACAUDAL; PERINEURAL
Status: COMPLETED | OUTPATIENT
Start: 2023-09-21 | End: 2023-09-21

## 2023-10-12 ENCOUNTER — OFFICE VISIT (OUTPATIENT)
Dept: ORTHOPEDIC SURGERY | Facility: CLINIC | Age: 68
End: 2023-10-12
Payer: OTHER GOVERNMENT

## 2023-10-12 VITALS — HEIGHT: 68 IN | BODY MASS INDEX: 22.42 KG/M2 | WEIGHT: 147.93 LBS

## 2023-10-12 DIAGNOSIS — M19.011: ICD-10-CM

## 2023-10-12 DIAGNOSIS — M25.511 RIGHT SHOULDER PAIN, UNSPECIFIED CHRONICITY: ICD-10-CM

## 2023-10-12 DIAGNOSIS — M19.011 OSTEOARTHRITIS OF RIGHT GLENOHUMERAL JOINT: Primary | ICD-10-CM

## 2023-10-12 PROCEDURE — 99213 OFFICE O/P EST LOW 20 MIN: CPT | Performed by: PHYSICIAN ASSISTANT

## 2023-10-12 NOTE — PROGRESS NOTES
[facility] Orthopaedic Surgery Established Patient Visit          Patient: Sue Bullock  YOB: 1955  Date of Encounter: 10/12/2023  PCP: Ras Serrato MD      Subjective     Chief Complaint   Patient presents with    Right Shoulder - Pain, Follow-up           History of Present Illness:     Sue Bullock is a 67 y.o. female presents today as result of right shoulder pain.  Patient states no specific injury she states that she had an incident where she rolled over in her bed and the right shoulder popped.  She describes what she thinks was a dislocation however she reports that upon certain motions and activities she will have a popping sensation with active deformity of the shoulder and will require several moments before reduction.  Patient presents today for MRI results review and further evaluation.  She has attempted anti-inflammatory medication intermittently as well as immobilization and intra-articular steroidal injection with continuation of pain symptoms.  She has questions in regards to further conservative treatment options secondary to the continued pain and symptoms.         There is no problem list on file for this patient.    History reviewed. No pertinent past medical history.  Past Surgical History:   Procedure Laterality Date    HYSTERECTOMY       Social History     Occupational History    Not on file   Tobacco Use    Smoking status: Never    Smokeless tobacco: Never   Vaping Use    Vaping Use: Never used   Substance and Sexual Activity    Alcohol use: Never    Drug use: Never    Sexual activity: Defer    Sue Bullock  reports that she has never smoked. She has never used smokeless tobacco.. I have educated her on the risk of diseases from using tobacco products such as cancer, COPD, and heart disease.             Social History     Social History Narrative    Not on file     Family History   Problem Relation Age of Onset    Diabetes Sister     Cancer Niece      "Breast cancer Niece      Current Outpatient Medications   Medication Sig Dispense Refill    atorvastatin (LIPITOR) 10 MG tablet Take 1 tablet by mouth Daily.      Ergocalciferol (VITAMIN D2 PO) Take 50,000 Units by mouth Every 7 (Seven) Days.       No current facility-administered medications for this visit.     Allergies   Allergen Reactions    Demerol [Meperidine] Itching     nausea    Penicillins Itching            Review of Systems   Constitutional: Positive for weight gain.   HENT: Negative.     Eyes: Negative.    Cardiovascular: Negative.    Respiratory: Negative.     Endocrine: Negative.    Hematologic/Lymphatic: Negative.    Skin: Negative.    Musculoskeletal:         Pertinent positives listed in HPI   Gastrointestinal: Negative.    Genitourinary: Negative.    Neurological: Negative.    Psychiatric/Behavioral: Negative.     Allergic/Immunologic: Negative.    All other systems reviewed and are negative.        Objective      Vitals:    10/12/23 1044   Weight: 67.1 kg (147 lb 14.9 oz)   Height: 172.7 cm (67.99\")      BMI is within normal parameters. No other follow-up for BMI required.      Physical Exam  Vitals and nursing note reviewed.   Constitutional:       General: She is not in acute distress.     Appearance: She is not ill-appearing.   HENT:      Head: Normocephalic and atraumatic.      Right Ear: External ear normal.      Left Ear: External ear normal.      Nose: Nose normal. No congestion or rhinorrhea.   Eyes:      Extraocular Movements: Extraocular movements intact.      Conjunctiva/sclera: Conjunctivae normal.      Pupils: Pupils are equal, round, and reactive to light.   Cardiovascular:      Rate and Rhythm: Normal rate.      Pulses: Normal pulses.   Pulmonary:      Effort: Pulmonary effort is normal. No respiratory distress.      Breath sounds: No stridor.   Abdominal:      General: There is no distension.   Musculoskeletal:      Cervical back: Normal range of motion.      Comments: Examination " today of the patient's right shoulder reveals painful forward elevation greater than 80 degrees.  Abduction 90 degrees before pain and apprehension.  Patient has adequate external rotation at side to approximately 30 degrees comparable to contralateral side.  There is crepitus palpable grinding upon external rotation and internal rotation of the right upper extremity.  Jobes maneuver painful speeds test negative.  Empty can test negative.  Apprehension test positive.  Neurovascular status grossly intact right upper extremity.   Skin:     General: Skin is warm and dry.      Capillary Refill: Capillary refill takes less than 2 seconds.   Neurological:      General: No focal deficit present.      Mental Status: She is alert and oriented to person, place, and time.   Psychiatric:         Mood and Affect: Mood normal.         Behavior: Behavior normal.         Thought Content: Thought content normal.         Judgment: Judgment normal.           Radiology:        MRI right shoulder performed 8/23/2023 from outside source reveals advanced arthrosis glenohumeral joint.  There is a lobulated 1 cm cyst and lesion inferior to the axillary pouch this is nonspecific although favored represent a paralabral cyst arising from the inferior labrum.  Low-grade partial-thickness tearing of the articular surface of the fibers of the supraspinatus and infraspinatus.  No full-thickness tear or retraction or atrophy.    XR Shoulder 2+ View Right    Result Date: 9/7/2023  1.  No acute fracture or dislocation. 2.  Mild-moderate osteoarthritis right shoulder.  This report was finalized on 9/7/2023 9:55 AM by Dr. Michael Gandhi MD.         Assessment/Plan        ICD-10-CM ICD-9-CM   1. Right shoulder pain, unspecified chronicity  M25.511 719.41   2. Osteoarthritis of right glenohumeral joint  M19.011 715.91   3. Arthrosis of shoulder region, right  M19.011 715.91         67-year-old female with notable right shoulder pain complaints as result  of an acute onset intermittent nature crepitus and pain and popping right shoulder.  Patient has notable evidence of advanced arthrosis glenohumeral joint and MRI evidence of rotator cuff pathology and a 1 cm cystic lesion thought to represent paralabral cyst. There is radiographic evidence concerning for shoulder girdle instability.  Patient has undergone intra-articular glenohumeral joint injection as well as subacromial injection with no alleviation of pain symptoms and continuation of weakness and apprehension.  Further discussion was had with the patient and she has been given referral to Dr. Fam to discuss surgical intervention secondary to the notable severe arthrosis in the glenohumeral joint.  Patient is agreeable and will progress forward with surgical consultation right shoulder accordingly.                    This document was signed by Regan Zamora PA-C October 12 , 2023    CC: Ras Serrato MD      Dictated Utilizing Dragon Dictation:   Please note that portions of this note were completed with a voice recognition program.   Part of this note may be an electronic transcription/translation of spoken language to printed text using the Dragon Dictation System.

## 2023-10-17 ENCOUNTER — OFFICE VISIT (OUTPATIENT)
Dept: ORTHOPEDIC SURGERY | Facility: CLINIC | Age: 68
End: 2023-10-17
Payer: OTHER GOVERNMENT

## 2023-10-17 ENCOUNTER — DOCUMENTATION (OUTPATIENT)
Dept: ORTHOPEDIC SURGERY | Facility: CLINIC | Age: 68
End: 2023-10-17

## 2023-10-17 VITALS
HEIGHT: 68 IN | DIASTOLIC BLOOD PRESSURE: 80 MMHG | BODY MASS INDEX: 22.28 KG/M2 | WEIGHT: 147 LBS | SYSTOLIC BLOOD PRESSURE: 120 MMHG

## 2023-10-17 DIAGNOSIS — M25.511 RIGHT SHOULDER PAIN, UNSPECIFIED CHRONICITY: Primary | ICD-10-CM

## 2023-10-17 DIAGNOSIS — M75.111 NONTRAUMATIC INCOMPLETE TEAR OF RIGHT ROTATOR CUFF: ICD-10-CM

## 2023-10-17 DIAGNOSIS — M75.21 BICEPS TENDINITIS OF RIGHT UPPER EXTREMITY: ICD-10-CM

## 2023-10-17 DIAGNOSIS — M19.011: ICD-10-CM

## 2023-10-17 DIAGNOSIS — M19.011 OSTEOARTHRITIS OF RIGHT GLENOHUMERAL JOINT: ICD-10-CM

## 2023-10-17 DIAGNOSIS — E11.9 TYPE 2 DIABETES MELLITUS WITHOUT COMPLICATION, WITHOUT LONG-TERM CURRENT USE OF INSULIN: ICD-10-CM

## 2023-10-17 DIAGNOSIS — M67.921 BICEPS TENDINOPATHY, RIGHT: ICD-10-CM

## 2023-10-17 PROCEDURE — 99214 OFFICE O/P EST MOD 30 MIN: CPT | Performed by: PHYSICIAN ASSISTANT

## 2023-10-17 RX ORDER — ASPIRIN 81 MG/1
81 TABLET, CHEWABLE ORAL
COMMUNITY

## 2023-10-17 NOTE — PROGRESS NOTES
Referral from SHY Wall.    I evaluated the patient today 10/17/2023 with SHY Andrews in our office.    Patient has chronic right shoulder pain.  I personally reviewed her MRI on CD and her report which is notable for partial rotator cuff tearing partial supraspinatus partial infraspinatus partial subscapularis no obvious full-thickness tearing noted.  Long head of the biceps tendinopathy, degenerative labral findings.  She has some early glenohumeral joint arthritic findings but her updated x-rays today show no severe arthritis with some joint space preservation still noted.    She has had a thoughtful conservative course with multiple injections and despite this course she still has significant pain.      She is interested in discussion of surgery today she was initially sent for possible total shoulder arthroplasty-counseled that I would recommend holding on any arthroplasty as she still has joint space preservation.  I did discuss that minimally invasive arthroscopic treatment is reasonable this cannot cure arthritis but it can help with debridement assess the rotator cuff for possible repair but more importantly assess the biceps for tenotomy versus tenodesis.    I counseled on operative versus nonoperative choices.  She is strongly in favor of operative intervention.  She understands that residual pain would be anticipated with regards to the arthritic findings but debridement may help with some of those findings as well.  Primary pain generator appears to be the long head of the biceps.    Exam notes anterior biceps pain generalized pain noted as well.      RIGHT SHOULDER  Arthroscopic biceps tenodesis CPT code 38414 - versus tenotomy -- counseled  Arthroscopic extensive debridement - CPT 18589    Rotator cuff will be assessed for possible repair but appears to be partial findings noted on MRI.

## 2023-10-17 NOTE — PROGRESS NOTES
"    [facility] [place] [facility] Note    Subjective     Chief Complaint   Patient presents with    Right Shoulder - Pain        HPI  Sue Bullock is a 67 y.o. female.  New patient presents for evaluation of right shoulder pain.  Symptoms/pain have been ongoing for 4 months.  LAZARO: Only event that patient recalls--rolled over in bed and her shoulder popped. Patient thought she dislocated her shoulder--seen at [facility]. Continues to having popping with certain motion/activity and needs to move shoulder to get it to \"reduce\". Patient previously seen by ortho in [place] after being referred by [facility].    Pain scale: 8/10.  Severity of the pain moderate to severe.  Quality of the pain stabbing.  Associated symptoms grinding, popping.  Activity related to pain certain shoulder movements, lifting, reaching, dressing, overhead activities.  Pain eased by hold arm to side and lying on affected side when sleeping.  No reported numbness or tingling.  Prior treatments subacromial and glenohumeral steroid injections (GHJ 9/17/2023, subacrom 9/21/2023), NSAIDs, home exercises--no benefit from conservative treatment.    Pain affecting quality of life and ADLs.    Denies fever, chills, night sweats or other constitutional symptoms. Patient non-smoker, positive DM.    Past Medical History:   Diagnosis Date    Diabetes       Past Surgical History:   Procedure Laterality Date    HYSTERECTOMY        Family History   Problem Relation Age of Onset    Diabetes Sister     Cancer Niece     Breast cancer Niece      Social History     Socioeconomic History    Marital status:    Tobacco Use    Smoking status: Never    Smokeless tobacco: Never   Vaping Use    Vaping Use: Never used   Substance and Sexual Activity    Alcohol use: Never    Drug use: Never    Sexual activity: Defer      Current Outpatient Medications on File Prior to Visit   Medication Sig Dispense Refill    aspirin 81 MG chewable tablet Chew 1 tablet.      atorvastatin " "(LIPITOR) 10 MG tablet Take 1 tablet by mouth Daily.      Ergocalciferol (VITAMIN D2 PO) Take 50,000 Units by mouth Every 7 (Seven) Days.      metFORMIN (GLUCOPHAGE) 850 MG tablet Take 1 tablet by mouth.       No current facility-administered medications on file prior to visit.      Allergies   Allergen Reactions    Demerol [Meperidine] Itching     nausea    Penicillins Itching        The following portions of the patient's history were reviewed and updated as appropriate: allergies, current medications, past family history, past medical history, past social history, past surgical history, and problem list.    Review of Systems   Constitutional: Negative.    HENT: Negative.     Eyes: Negative.    Respiratory: Negative.     Cardiovascular: Negative.    Gastrointestinal: Negative.    Endocrine: Negative.    Genitourinary: Negative.    Musculoskeletal:  Positive for arthralgias.   Skin: Negative.    Allergic/Immunologic: Negative.    Neurological: Negative.    Hematological: Negative.    Psychiatric/Behavioral: Negative.          Objective      Physical Exam  /80   Ht 172.7 cm (67.99\")   Wt 66.7 kg (147 lb)   BMI 22.36 kg/m²     Body mass index is 22.36 kg/m².    GENERAL APPEARANCE: awake, alert & oriented x 3, in no acute distress and well developed, well nourished  PSYCH: normal mood and affect  LUNGS:  breathing nonlabored, no wheezing  EYES: sclera anicteric, pupils equal  CARDIOVASCULAR: palpable pulses. Capillary refill less than 2 seconds  INTEGUMENTARY: skin intact, no clubbing, cyanosis  NEUROLOGIC:  Normal Sensation         Ortho Exam  Right Shoulder  Skin: Intact without any erythema, warmth or swelling.   Tenderness: Positive predominantly anterior shoulder, deep inside shoulder, along biceps tendon/groove. Additional pain posterior/lateral shoulder. ACJ negative  Motion: Active , Abd 160, ER (elbows at side) 65, IR L1. Pain and crepitus noted on exam.  Impingement: Neer positive.  Mariah " positive.  Rotator cuff: Thomas/Empty can positive. Drop arm negative. Lift-off/modified lift-off positive.   Biceps: Speed's positive.  ACJ: Adduction cross body negative.  Deltoid: Intact  Strength: 5/5 SS, IS, SubSc  Motor: Grossly intact to Ax/MSC/R/U/M/AIN/PIN  Sensory: Grossly intact to Ax/MSC/R/U/M nerve distributions.  Vascular: 2+ radial pulse with brisk capillary refill into each digit.      Imaging/Studies  Ordered right shoulder plain films.  Imaging read/interpreted by Dr. Fam.    Imaging Results (Last 7 Days)       Procedure Component Value Units Date/Time    XR Shoulder 2+ View Right [[id number]] Resulted: 10/17/23 1352     Updated: 10/17/23 1352    Narrative:      Imaging: shoulder x-rays 3 views - AP, axillary, and scapular-Y x-ray   views    Side: Right shoulder    Indication for shoulder x-ray 3 views: shoulder pain    Comparison: Prior clinic and MRI comparison views available    Findings: No acute bony pathology. The humeral head remains centered in   the glenohumeral joint. No evidence of calcific tendonitis.  Baseline   degenerative changes with mild glenohumeral joint arthritic findings   noted.    I personally reviewed the above x-rays.          Dr. Fam and I also reviewed MRI from 8/23/2023 from an outside source.  Partial rotator cuff tearing (SS, IS, Subscap)--without retraction. Long head of the biceps tendinopathy, degenerative labral, GH arthritis.     Laboratory data  A1c as 11/2/22--5.9  Assessment/Plan        ICD-10-CM ICD-9-CM   1. Right shoulder pain, unspecified chronicity  M25.511 719.41   2. Biceps tendinopathy, right  M67.921 727.9   3. Nontraumatic incomplete tear of right rotator cuff  M75.111 726.13   4. Osteoarthritis of right glenohumeral joint  M19.011 715.91   5. Type 2 diabetes mellitus without complication, without long-term current use of insulin  E11.9 250.00       Orders Placed This Encounter   Procedures    XR Shoulder 2+ View Right        -Right shoulder pain  due to biceps tendinopathy, partial RCT and GH OA.  -Reviewed imaging.  -To date patient has failed to benefit from conservative treatment (injections, home exercises, NSAIDs).  -Patient was introduced to Dr. Fam.  -Discussion was had with the patient regarding continued non-operative management or proceeding with surgical intervention. Wishes to proceed with surgical intervention.  -At this time do not believe patient requires TSA as she still has preservation of joint space.  -Recommend right shoulder arthroscopy with biceps tenotomy versus tenodesis and rotator cuff repair versus debridement.  -Discussed risks, benefits, indications of surgery along with post operative recovery and rehab.  -Recommend OTC NSAIDs/pain medication as needed for now.  -Diabetes--continue working with PCP to maintain tight blood sugar/glucose control.  -Schedule for right shoulder surgery with Dr. Fam. Provided Ultra sling today.  -Questions and concerns answered.    Patient was also examined by Dr. Fam and he agrees with the above assessment and plan.    Indications, risks, benefits of surgical treatment were discussed with the patient. Surgical risks include but are not limited to pain, bleeding, infection, failure to relieve symptoms, need for further procedures, recurrence of symptoms, damage to healthy adjacent structures, hardware loosening/failure, stiffness, weakness, scar, DVT/PE, loss of limb or life. We also discussed the postoperative protocol and expected outcome. All questions were answered; the patient would like to proceed with surgical intervention.       Medical Decision Making  Management Options : over-the-counter medicine and major surgery with risk factors  Data/Risk: lab tests and radiology tests      Dia Razo PA-C  10/17/23  21:28 EDT               EMR Dragon/Transcription disclaimer:  Much of this encounter note is an electronic transcription of spoken language to printed text. Electronic  transcription of spoken language may permit erroneous, or at times, nonsensical words or phrases to be inadvertently transcribed. Although I have reviewed the note for such errors, some may still exist.

## 2023-11-13 ENCOUNTER — TELEPHONE (OUTPATIENT)
Dept: ORTHOPEDIC SURGERY | Facility: CLINIC | Age: 68
End: 2023-11-13

## 2023-11-13 NOTE — TELEPHONE ENCOUNTER
Caller: LORENA    Relationship to patient: Other    Best call back number: [phone]    Patient is needing: [facility] TO DATE AND TIME OF PROCEDURE THAT IS SCHEDULED FOR 11/17/23. SHE IS ALSO WANTING TO KNOW IF THERE WAS A CONSULT FOR THE PROCEDURE BEFOREHAND FOR HER TO START APPROVAL ON FOR.

## 2023-11-17 ENCOUNTER — OUTSIDE FACILITY SERVICE (OUTPATIENT)
Dept: ORTHOPEDIC SURGERY | Facility: CLINIC | Age: 68
End: 2023-11-17
Payer: OTHER GOVERNMENT

## 2023-11-17 ENCOUNTER — DOCUMENTATION (OUTPATIENT)
Dept: ORTHOPEDIC SURGERY | Facility: CLINIC | Age: 68
End: 2023-11-17

## 2023-11-17 DIAGNOSIS — M75.111 NONTRAUMATIC INCOMPLETE TEAR OF RIGHT ROTATOR CUFF: ICD-10-CM

## 2023-11-17 DIAGNOSIS — Z98.890 STATUS POST ARTHROSCOPY OF RIGHT SHOULDER: Primary | ICD-10-CM

## 2023-11-17 DIAGNOSIS — M19.011 OSTEOARTHRITIS OF RIGHT GLENOHUMERAL JOINT: ICD-10-CM

## 2023-11-17 DIAGNOSIS — M67.921 BICEPS TENDINOPATHY, RIGHT: ICD-10-CM

## 2023-11-17 DIAGNOSIS — M19.011: ICD-10-CM

## 2023-11-17 DIAGNOSIS — M75.21 BICEPS TENDINITIS OF RIGHT UPPER EXTREMITY: ICD-10-CM

## 2023-11-17 DIAGNOSIS — M25.511 RIGHT SHOULDER PAIN, UNSPECIFIED CHRONICITY: ICD-10-CM

## 2023-11-17 PROCEDURE — 29823 SHO ARTHRS SRG XTNSV DBRDMT: CPT | Performed by: ORTHOPAEDIC SURGERY

## 2023-11-17 PROCEDURE — 29826 SHO ARTHRS SRG DECOMPRESSION: CPT

## 2023-11-17 PROCEDURE — 29826 SHO ARTHRS SRG DECOMPRESSION: CPT | Performed by: ORTHOPAEDIC SURGERY

## 2023-11-17 PROCEDURE — 29823 SHO ARTHRS SRG XTNSV DBRDMT: CPT

## 2023-11-17 RX ORDER — HYDROCODONE BITARTRATE AND ACETAMINOPHEN 10; 325 MG/1; MG/1
1 TABLET ORAL EVERY 4 HOURS PRN
Qty: 42 TABLET | Refills: 0 | Status: SHIPPED | OUTPATIENT
Start: 2023-11-17 | End: 2023-11-24

## 2023-11-17 RX ORDER — ONDANSETRON 4 MG/1
4 TABLET, FILM COATED ORAL EVERY 6 HOURS PRN
Qty: 30 TABLET | Refills: 1 | Status: SHIPPED | OUTPATIENT
Start: 2023-11-17 | End: 2023-11-25

## 2023-11-17 NOTE — PROGRESS NOTES
Operative Report     Side/SHOULDER: Right shoulder    LOCATION:   [address] [address]    [facility] OPERATIVE REPORT       Surgeon: Damian Fam MD     Assistant: SHY Fatima     The skilled assistance of the above noted first assistant was necessary during this complex surgical procedure.  The surgical assistant assisted with every aspect of the operation including, but not limited to, proper and safe positioning of the patient, obtaining adequate surgical exposure, manipulation of surgical instruments, suture management, surgical knot tying when necessary, the continual process of hemostasis during the procedure itself in addition to surgical wound closure and removal of the patient from the operating table and returning the patient back to the [facility].  The assistance of the surgical assistant allowed me to perform the most sensitive and technical potions of this operation using 2 hands, thus enhancing efficiency and patient safety.  This would not be possible without the help of a skilled assistant familiar with the procedure and capable of safely performing the aforementioned tasks.     Date of Surgery: 11/17/2023  Shoulder: Right shoulder shoulder  Preoperative Diagnosis:  1.     Glenohumeral joint arthritis, degenerative labral tearing, partial biceps tearing- treated with arthroscopic extensive debridement - CPT 15718  2.     Shoulder impingement syndrome - treated with arthroscopic subacromial decompression with acromioplasty - CPT 01482     Postoperative Diagnosis:  1.     SAME as preoperative diagnoses     Procedure:    Arthroscopic extensive debridement - CPT 71579    2.     Arthroscopic subacromial decompression with acromioplasty - CPT 63269        Admission status: elective outpatient surgery     COVID-19 Statement: The patient understands that the surgery is elective surgery.  Patient is aware of the  ongoing COVID-19 pandemic and potential implications.     Complications: None     EBL: 10mL     Specimen: NONE     Anesthesia: general anesthesia     Block: regional interscalene block with single shot per anesthesia     Antibiotics: weight based IV antibiotics infused prior to incision     Time out: Time out was called and the patient, side, site, and intended procedures were confirmed.     Counts: Needle and sponge counts were correct prior to closure.     DVT prophylaxis: The patient will be weight bearing as tolerated on bilateral lower extremities postoperatively with no additional chemical DVT prophylaxis indicated.     Marked: The patient was marked with an indelible marker in the preoperative holding area confirming the correct side and site.     History & Physical:   A history and physical examination was completed and updated in the preoperative holding area.     Consent: The patient signed the consent form for surgery with an understanding of the risks and benefits as outlined in the clinic and in the preoperative holding area.     Risks and Benefits: Specific risks and benefits discussed included - pain, bleeding, infection, injury to nerves or blood vessels, fracture, stiffness, failure to heal, revision surgery, deformity of biceps or asymmetry, complications of the block, anesthetic complications, and medical complications associated with surgery.        Indications for surgery:  The patient has history, physical examination, and radiographic findings confirming the diagnoses.  The patient has persistent pain and functional loss unresponsive to non-operative treatment consisting of selective rest/activity modification, medications, and exercises.      Patient felt that she had exhausted conservative course.  She was sent to me for consideration of total shoulder arthroplasty.  She does indeed have glenohumeral joint arthritis as noted on x-ray and MRI but did not feel that her level of arthritis meets  criteria for total shoulder arthroplasty.  Counseled that arthroscopic debridement may not relieve all of her pain but hope that debridement plus addressing the long head of the biceps and confirming the status of the rotator cuff would be beneficial understanding that she may ultimately progress to needing shoulder arthroplasty at some point.    Additionally she had reasonable range of motion with examination under anesthesia showing this is in the fairly early stages of her glenohumeral joint arthritis.  She did have fairly significant pain limited examination in the clinic.    Still somewhat guarded prognosis given the preoperative pain level however the debridement and addressing the biceps should help with pain relief.      I specifically counseled on tenodesis versus tenotomy.  Tenotomy was preferred here to prevent postoperative stiffness with precautions as she can proceed effectively with activities as tolerated moving forward.  No deformity was noted intraoperatively following tenotomy.  Extensive debridement was performed within the joint.     Impingement syndrome - the patient had history and clinical exam findings consistent with shoulder impingement syndrome.  Additionally, the patient had subacromial bursitis which was encountered during the arthroscopic shoulder procedure.  Subacromial decompression with minimal acromioplasty was indicated as part of the treatment of impingement syndrome partial tearing of the CA ligament was noted and debrided as part of the subacromial decompression.     Operative Findings:  Rotator Cuff Tissue Quality: No full-thickness rotator cuff tearing was noted.  Perhaps small bursal sided fraying which was debrided     Status of the Rotator Cuff:  No full-thickness tearing minimal frayingSize of Rotator Cuff Tear:     Biceps: tendinopathy and synovitis along with degenerative labral tearing     Humeral Head: Grade 4 changes consistent with glenohumeral joint  arthritis  Glenoid: Grade 4 changes consistent with glenohumeral joint arthritis     Contracture: No significant contracture despite pain limited exam in the office and known glenohumeral joint arthritis  Pathological laxity: Negative     Procedure in detail:  Regional interscalene block was completed in the preoperative area by the anesthesia team.  The patient was supine on the operative table and the anesthesia team initiated general anesthesia.  The patient was placed in a modified beach chair position with the neck secured in neutral alignment and all bony prominences were well padded.     The shoulder was assessed with an examination under anesthesia.     The operative extremity was cleaned with alcohol and then the extremity was prepped and draped in the standard fashion.  The surgical arm was placed into a well-padded arm mcdonald. A standard posterior portal was created with an incision made 1 cm inferior 1 cm medial to the posterolateral acromial corner.  A blunt metal trocar and cannula were inserted into the glenohumeral joint.  The arthroscopic pump was connected and the above findings and diagnoses were noted as part of the diagnostic shoulder arthroscopy.  Pathologic biceps tendon was noted with partial tearing and extensive labral tearing.  Grade 4 changes on the glenoid and the humeral side.     A spinal needle was used to localize the anterior portal position in the rotator interval. A 5.5mm plastic cannula and trocar were inserted followed by a 4.5mm shaver/cautery device.  The shaver was used for debridement in the glenohumeral joint.  Arthroscopic scissors were used to perform biceps tenotomy and the remaining intra-articular portion of the biceps tendon was debrided using the shaver/cautery device.    Extensive debridement performed within the glenohumeral joint included chondroplasty on the humeral head, chondroplasty on the glenoid, debridement of the rotator interval, debridement of  degenerative labral tearing, debridement of residual biceps stump/biceps tenotomy.     The arthroscope and metal cannula were then removed in order to transition to the subacromial space.  I turned my attention to the arthroscopic subacromial decompression with acromioplasty. Bursitis was noted in the subacromial space and impacted visualization of the rotator cuff.  The 4.5mm shaver/cautery device was used to clear the subacromial space until the acromion could be identified and bursal resection was completed to allow rotator cuff visualization.  The shaver was used to remove fibrous tissue from the acromial undersurface until the anterolateral and anterior medial corners of the acromion were clearly identified.  The coracoacromial ligament was not released.  CA ligament tearing was noted and debrided as part of the subacromial decompression.  The shaver was used to perform a minimal acromioplasty.  The shaver/cautery device was used to perform the subacromial decompression with minimal acromioplasty.    The rotator cuff was closely scrutinized intra-articularly and in the extra-articular/subacromial space.  Perhaps small bursal sided fraying noted this was lightly debrided but no indication for rotator cuff tear with no full-thickness tearing.    The arthroscopic instruments removed along with the arthroscopic fluid. The incisions were closed with nylon suture and steri-strips were placed over the incisions.  A sterile dressing was applied followed by a neutral rotation sling.  The patient tolerated the procedure well and was transported to the recovery room in satisfactory condition.       POSTOPERATIVE PLAN:  We will protect her for the first 2 weeks following surgery for comfort but okay for light activities  Follow-up in the office in 2 weeks with 1 view of the operative shoulder at that time  Sutures: Nylon sutures to come out in 2 weeks  DVT prophylaxis: No additional chemical DVT prophylaxis  indicated  Weightbearing: Nonweightbearing on operative extremity, no biceps loading, pendulums/elbow/wrist/hand motion encouraged  Neutral rotation sling for 2 weeks following the surgery-for comfort  Physical therapy: Formal outpatient physical therapy will be provided at the 2-week appointment in the office.    Okay to proceed with activities as tolerated at the 2-week appointment with therapy to progress accordingly    Electronically signed by Damian Fam MD, 11/17/23, 9:30 AM EST.

## 2023-11-30 ENCOUNTER — OFFICE VISIT (OUTPATIENT)
Dept: ORTHOPEDIC SURGERY | Facility: CLINIC | Age: 68
End: 2023-11-30
Payer: OTHER GOVERNMENT

## 2023-11-30 VITALS — TEMPERATURE: 97.1 F

## 2023-11-30 DIAGNOSIS — Z98.890 STATUS POST ARTHROSCOPY OF RIGHT SHOULDER: Primary | ICD-10-CM

## 2023-11-30 NOTE — PROGRESS NOTES
[facility] Follow Up       Office Follow Up Visit       Patient Name: Sue Bullock    Chief Complaint:   Chief Complaint   Patient presents with    Post-op     2 weeks s/p Arthroscopic Extensive debridement and subacromial decompression with acromioplasty , right shoulder 11/17/23         Referring Physician: No ref. provider found    History of Present Illness:   Sue Bullock returns to clinic today for 2-week postop visit s/p right shoulder arthroscopy for extensive debridement and subacromial decompression with Dr. Fam.  Here with supportive .  She reports to be doing well.  She is having an expected amount of postoperative pain.  Controlled with Tylenol.  She has been wearing sling for comfort.  Taking breaks as needed.    Procedure:    Arthroscopic extensive debridement - CPT 78519    2.     Arthroscopic subacromial decompression with acromioplasty - CPT 96735    Patient initially referred to Dr. Fam for consideration of shoulder replacement.  She has known glenohumeral joint arthritis with degenerative labral tearing.    Subjective     Review of Systems   Constitutional: Negative.  Negative for chills, fatigue and fever.   HENT: Negative.  Negative for congestion and dental problem.    Eyes: Negative.  Negative for blurred vision.   Respiratory: Negative.  Negative for shortness of breath.    Cardiovascular: Negative.  Negative for leg swelling.   Gastrointestinal: Negative.  Negative for abdominal pain.   Endocrine: Negative.  Negative for polyuria.   Genitourinary: Negative.  Negative for difficulty urinating.   Musculoskeletal:  Positive for arthralgias.   Skin: Negative.    Allergic/Immunologic: Negative.    Neurological: Negative.    Hematological: Negative.  Negative for adenopathy.   Psychiatric/Behavioral: Negative.  Negative for behavioral problems.         I have reviewed and updated the  following portions of the patient's history and review of systems: allergies, current medications, past family history, past medical history, past social history, past surgical history and problem list.    Medications:   Current Outpatient Medications:     aspirin 81 MG chewable tablet, Chew 1 tablet., Disp: , Rfl:     atorvastatin (LIPITOR) 10 MG tablet, Take 1 tablet by mouth Daily., Disp: , Rfl:     metFORMIN (GLUCOPHAGE) 850 MG tablet, Take 1 tablet by mouth., Disp: , Rfl:     Allergies:   Allergies   Allergen Reactions    Demerol [Meperidine] Itching     nausea    Penicillins Itching         Objective      Vital Signs:   Vitals:    11/30/23 0923   Temp: 97.1 °F (36.2 °C)       Ortho Exam:  General: comfortable  Vascular: 2+ radial pulse  Neurologic: sensation to light touch is intact distally, elbow flexion/elbow extension/wrist flexion/wrist extension/hand intrinsics intact, able to fire deltoid; no residual defects noted from the block  Dermatologic: surgical incisions are well appearing, with no drainage or surrounding erythema; no signs or symptoms of infection or DVT      Results Review:  XR Shoulder 1 View Right  Imaging: shoulder x-rays 1 view - AP x-ray views    Side: RIGHT SHOULDER    Indication for shoulder x-ray 1 view: shoulder pain, postop evaluation   following surgery    Comparison: the current xray was compared to preoperative imaging and   indicates expected postoperative changes.    Findings: No acute bony findings.  Baseline arthritic changes.    I personally reviewed the above x-rays.         Assessment / Plan      Assessment:   Diagnoses and all orders for this visit:    1. Status post arthroscopy of right shoulder (Primary)  -     XR Shoulder 1 View Right        Quality Metrics:   BMI:   BMI is within normal parameters. No other follow-up for BMI required.       Tobacco:   Sue Newton Kobe  reports that she has never smoked. She has never used smokeless tobacco..        Plan:  X-ray films  revealed no acute bony findings.  Recommend over the counter anti-inflammatories for pain and/or swelling.  Patient will begin to wean out of sling use.  She may start using right shoulder as tolerated.  Encouraged to go slow.  She will start with physical therapy.  She prefers to do therapy exercises on her own. Given handout today. She says her friend is a physical therapist who will give her additional exercises.  She plans to join the wellness center close to home to complete the exercises.  We reviewed the intraoperative photographs together.  Sutures out today.       History, diagnosis and treatment plan discussed with Dr. Fam.      Follow Up:   2 months    Dawn Munoz PA-C  [facility]    Dictated using Dragon Speech Recognition.

## 2024-02-06 ENCOUNTER — OFFICE VISIT (OUTPATIENT)
Dept: ORTHOPEDIC SURGERY | Facility: CLINIC | Age: 69
End: 2024-02-06
Payer: OTHER GOVERNMENT

## 2024-02-06 DIAGNOSIS — M19.011 PRIMARY OSTEOARTHRITIS OF RIGHT SHOULDER: ICD-10-CM

## 2024-02-06 DIAGNOSIS — Z98.890 STATUS POST ARTHROSCOPY OF RIGHT SHOULDER: Primary | ICD-10-CM

## 2024-02-06 PROCEDURE — 99024 POSTOP FOLLOW-UP VISIT: CPT

## 2024-02-06 NOTE — PROGRESS NOTES
[facility] [place] [facility] Office Follow Up       Office Follow Up Visit       Patient Name: Sue Bullock    Chief Complaint:   Chief Complaint   Patient presents with    Post-op     2 month recheck- 2.5 months s/p Arthroscopic Extensive debridement and subacromial decompression with acromioplasty , right shoulder 11/17/23       Referring Physician: No ref. provider found    History of Present Illness:   Sue Bullock returns to clinic today for postop visit 2.5 months s/p right arthroscopic extensive debridement and subacromial decompression with Dr. Fam.  She reports to be doing extremely well.  She worked with her friend who is a PT on shoulder exercises. She has regained her full range of motion. She has returned to swimming. Taking it easy with weights.    Initially referred to Dr. Fam for consideration of shoulder replacement. Chronic right shoulder pain with arthritic findings. No full-thickness rotator cuff tears      Subjective     Review of Systems   Constitutional:  Negative for chills, fever, unexpected weight gain and unexpected weight loss.   HENT:  Negative for congestion, postnasal drip and rhinorrhea.    Eyes:  Negative for blurred vision.   Respiratory:  Negative for shortness of breath.    Cardiovascular:  Negative for leg swelling.   Gastrointestinal:  Negative for abdominal pain, nausea and vomiting.   Genitourinary:  Negative for difficulty urinating.   Musculoskeletal:  Positive for arthralgias. Negative for gait problem, joint swelling and myalgias.   Skin:  Negative for skin lesions and wound.   Neurological:  Negative for dizziness, weakness, light-headedness and numbness.   Hematological:  Does not bruise/bleed easily.   Psychiatric/Behavioral:  Negative for depressed mood.         I have reviewed and updated the following portions of the patient's history and review of systems: allergies, current medications,  past family history, past medical history, past social history, past surgical history and problem list.    Medications:   Current Outpatient Medications:     aspirin 81 MG chewable tablet, Chew 1 tablet., Disp: , Rfl:     atorvastatin (LIPITOR) 10 MG tablet, Take 1 tablet by mouth Daily., Disp: , Rfl:     metFORMIN (GLUCOPHAGE) 850 MG tablet, Take 1 tablet by mouth., Disp: , Rfl:     Allergies:   Allergies   Allergen Reactions    Demerol [Meperidine] Itching     nausea    Penicillins Itching         Objective      Vital Signs: There were no vitals filed for this visit.    Ortho Exam:  General: no acute distress, comfortable  Vitals reviewed in chart    Musculoskeletal Exam:    SIDE: Right shoulder  Incisions well-healed    Range of motion measurements (degrees): 160/160/60/60  Painful arc of motion: No  Negative lag sign  No evidence of septic joint      Results Review:  XR Shoulder 1 View Right  Imaging: shoulder x-rays 1 view - AP x-ray views    Side: RIGHT SHOULDER    Indication for shoulder x-ray 1 view: shoulder pain, postop evaluation   following surgery    Comparison: the current xray was compared to preoperative imaging and   indicates expected postoperative changes.    Findings: No acute bony findings.  Baseline arthritic changes.    I personally reviewed the above x-rays.       Assessment / Plan      Assessment:   Diagnoses and all orders for this visit:    1. Status post arthroscopy of right shoulder (Primary)    2. Primary osteoarthritis of right shoulder        Quality Metrics:   BMI:   BMI is within normal parameters. No other follow-up for BMI required.       Tobacco:   Sue Bullock  reports that she has never smoked. She has never used smokeless tobacco..        Plan:  She is doing very well 2.5 months s/p right shoulder extensive debridement and subacromial decompression.  She has returned to normal activity and exercise. She may continue with activity as tolerated.    She understands she has  extensive arthritic findings at baseline and may be candidate for shoulder replacement surgery in the future.  Hopeful to avoid replacement for potentially many years. She will keep us updated regarding further issues and follow-up as needed.    History, diagnosis and treatment plan discussed with Dr. Fam.    Follow Up:   As needed    Dawn Munoz PA-C  [facility] [place] [facility]    Dictated using Dragon Speech Recognition.

## 2024-09-05 ENCOUNTER — TRANSCRIBE ORDERS (OUTPATIENT)
Dept: ADMINISTRATIVE | Facility: HOSPITAL | Age: 69
End: 2024-09-05
Payer: OTHER GOVERNMENT

## 2024-09-05 DIAGNOSIS — Z12.31 VISIT FOR SCREENING MAMMOGRAM: Primary | ICD-10-CM

## 2024-09-12 ENCOUNTER — HOSPITAL ENCOUNTER (OUTPATIENT)
Dept: MAMMOGRAPHY | Facility: HOSPITAL | Age: 69
Discharge: HOME | End: 2024-09-12
Admitting: INTERNAL MEDICINE
Payer: OTHER GOVERNMENT

## 2024-09-12 DIAGNOSIS — Z12.31 VISIT FOR SCREENING MAMMOGRAM: ICD-10-CM

## 2024-09-12 PROCEDURE — 77067 SCR MAMMO BI INCL CAD: CPT

## 2024-09-12 PROCEDURE — 77063 BREAST TOMOSYNTHESIS BI: CPT
